# Patient Record
Sex: MALE
[De-identification: names, ages, dates, MRNs, and addresses within clinical notes are randomized per-mention and may not be internally consistent; named-entity substitution may affect disease eponyms.]

---

## 2020-04-26 ENCOUNTER — MESSAGE (OUTPATIENT)
Age: 25
End: 2020-04-26

## 2020-06-10 ENCOUNTER — TRANSCRIPTION ENCOUNTER (OUTPATIENT)
Age: 25
End: 2020-06-10

## 2020-08-28 ENCOUNTER — APPOINTMENT (OUTPATIENT)
Dept: INTERNAL MEDICINE | Facility: CLINIC | Age: 25
End: 2020-08-28

## 2020-09-09 ENCOUNTER — LABORATORY RESULT (OUTPATIENT)
Age: 25
End: 2020-09-09

## 2020-09-09 ENCOUNTER — APPOINTMENT (OUTPATIENT)
Dept: INTERNAL MEDICINE | Facility: CLINIC | Age: 25
End: 2020-09-09
Payer: COMMERCIAL

## 2020-09-09 VITALS
SYSTOLIC BLOOD PRESSURE: 110 MMHG | DIASTOLIC BLOOD PRESSURE: 80 MMHG | HEIGHT: 65 IN | WEIGHT: 164 LBS | BODY MASS INDEX: 27.32 KG/M2

## 2020-09-09 DIAGNOSIS — Z78.9 OTHER SPECIFIED HEALTH STATUS: ICD-10-CM

## 2020-09-09 DIAGNOSIS — Z00.00 ENCOUNTER FOR GENERAL ADULT MEDICAL EXAMINATION W/OUT ABNORMAL FINDINGS: ICD-10-CM

## 2020-09-09 DIAGNOSIS — L74.0 MILIARIA RUBRA: ICD-10-CM

## 2020-09-09 DIAGNOSIS — Z13.220 ENCOUNTER FOR SCREENING FOR LIPOID DISORDERS: ICD-10-CM

## 2020-09-09 DIAGNOSIS — Z87.891 PERSONAL HISTORY OF NICOTINE DEPENDENCE: ICD-10-CM

## 2020-09-09 DIAGNOSIS — Z13.0 ENCOUNTER FOR SCREENING FOR DISEASES OF THE BLOOD AND BLOOD-FORMING ORGANS AND CERTAIN DISORDERS INVOLVING THE IMMUNE MECHANISM: ICD-10-CM

## 2020-09-09 DIAGNOSIS — Z23 ENCOUNTER FOR IMMUNIZATION: ICD-10-CM

## 2020-09-09 LAB
SARS-COV-2 IGG SERPL IA-ACNC: 2.5 AU/ML
SARS-COV-2 IGG SERPL QL IA: NEGATIVE

## 2020-09-09 PROCEDURE — 90686 IIV4 VACC NO PRSV 0.5 ML IM: CPT | Mod: GC

## 2020-09-09 PROCEDURE — G0008: CPT | Mod: GC

## 2020-09-09 PROCEDURE — G0444 DEPRESSION SCREEN ANNUAL: CPT | Mod: NC,GC,59

## 2020-09-09 PROCEDURE — G0442 ANNUAL ALCOHOL SCREEN 15 MIN: CPT | Mod: NC,GC

## 2020-09-09 PROCEDURE — 99385 PREV VISIT NEW AGE 18-39: CPT | Mod: GC,25

## 2020-09-09 RX ORDER — HYDROCORTISONE 0.5 G/100G
0.5 CREAM TOPICAL TWICE DAILY
Qty: 1 | Refills: 0 | Status: COMPLETED | COMMUNITY
Start: 2020-09-09 | End: 2020-10-09

## 2020-09-09 NOTE — END OF VISIT
[] : Resident [FreeTextEntry3] : 25M no PMH; rash in groin does not look fungal nor does it look like folliculitis. Some pink papules, not erythematous, does not look like any bug bite, no excoriations. [place] areas are blanching. No erythema. Pt states typically resolves with focus on hygiene. Looks possibly like irritant contact dermatitis? recommending low potency topical steroid, loose clothing, keeping area clear.

## 2020-09-09 NOTE — ASSESSMENT
[FreeTextEntry1] : 25M w/ no significant PMH presents to the office to establish care.\par \par #HCM\par - Flu shot today, last Tdap 9/1/2019\par - Declined HIV testing as pt sexually active with spouse only

## 2020-09-09 NOTE — REVIEW OF SYSTEMS
[Skin Rash] : skin rash [Negative] : Heme/Lymph [Nasal Discharge] : no nasal discharge [Sore Throat] : no sore throat [Hoarseness] : no hoarseness [Chest Pain] : no chest pain [Lower Ext Edema] : no lower extremity edema [Palpitations] : no palpitations [Shortness Of Breath] : no shortness of breath [Cough] : no cough [Abdominal Pain] : no abdominal pain [Nausea] : no nausea [Constipation] : no constipation [Diarrhea] : no diarrhea [Vomiting] : no vomiting [Itching] : no itching [Anxiety] : no anxiety [Depression] : no depression

## 2020-09-09 NOTE — HISTORY OF PRESENT ILLNESS
[FreeTextEntry1] : Rash in groin and pre employment physical [de-identified] : 25M w/ no significant PMH presents to the office to establish care. 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 His main concern is a rash on both sides of the groin which developed about a week ago but has been improving with just maintaining hygiene. He had this type of rash before a few years ago and attributes it to excessive sweating, but the rash coincidentally resolved after he took amoxicillin for suspected Strep throat at the time. He has sufficient hep B titers (as part of pre-employment testing in March 2020) and had his Tdap a year ago. \par \par Patient eats his meals late due to work but has been trying to maintain a balanced diet. He exercises at the gym 2-3 times a week. He is currently sexually active with his spouse and thus declined HIV testing.

## 2020-09-09 NOTE — HEALTH RISK ASSESSMENT
[] : Yes [0-5] : 0-5 [Yes] : Yes [Monthly or less (1 pt)] : Monthly or less (1 point) [1 or 2 (0 pts)] : 1 or 2 (0 points) [Never (0 pts)] : Never (0 points) [No] : In the past 12 months have you used drugs other than those required for medical reasons? No [0] : 2) Feeling down, depressed, or hopeless: Not at all (0) [HIV test declined] : HIV test declined [With Significant Other] : lives with significant other [Employed] : employed [] :  [Sexually Active] : sexually active [Fully functional (using the telephone, shopping, preparing meals, housekeeping, doing laundry, using] : Fully functional and needs no help or supervision to perform IADLs (using the telephone, shopping, preparing meals, housekeeping, doing laundry, using transportation, managing medications and managing finances) [Fully functional (bathing, dressing, toileting, transferring, walking, feeding)] : Fully functional (bathing, dressing, toileting, transferring, walking, feeding) [YearQuit] : 2015 [Audit-CScore] : 1 [[id number]] : 0 [High Risk Behavior] : no high risk behavior [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

## 2020-09-09 NOTE — PHYSICAL EXAM
[No Acute Distress] : no acute distress [Well Developed] : well developed [Well-Appearing] : well-appearing [Normal Sclera/Conjunctiva] : normal sclera/conjunctiva [Normal Outer Ear/Nose] : the outer ears and nose were normal in appearance [Normal Oropharynx] : the oropharynx was normal [No Lymphadenopathy] : no lymphadenopathy [Supple] : supple [No Respiratory Distress] : no respiratory distress  [No Accessory Muscle Use] : no accessory muscle use [Clear to Auscultation] : lungs were clear to auscultation bilaterally [Normal Rate] : normal rate  [Regular Rhythm] : with a regular rhythm [Normal S1, S2] : normal S1 and S2 [No Murmur] : no murmur heard [No Varicosities] : no varicosities [Pedal Pulses Present] : the pedal pulses are present [No Edema] : there was no peripheral edema [No Extremity Clubbing/Cyanosis] : no extremity clubbing/cyanosis [Soft] : abdomen soft [Non-distended] : non-distended [Non Tender] : non-tender [No Masses] : no abdominal mass palpated [Normal Bowel Sounds] : normal bowel sounds [No Joint Swelling] : no joint swelling [Grossly Normal Strength/Tone] : grossly normal strength/tone [Coordination Grossly Intact] : coordination grossly intact [No Focal Deficits] : no focal deficits [Normal Gait] : normal gait [Normal Affect] : the affect was normal [Normal Insight/Judgement] : insight and judgment were intact [de-identified] : Several small erythematous papules on both sides of the groin, no association with hair follicles